# Patient Record
Sex: MALE | Race: ASIAN
[De-identification: names, ages, dates, MRNs, and addresses within clinical notes are randomized per-mention and may not be internally consistent; named-entity substitution may affect disease eponyms.]

---

## 2019-03-31 ENCOUNTER — HOSPITAL ENCOUNTER (EMERGENCY)
Dept: HOSPITAL 25 - UCEAST | Age: 34
Discharge: HOME | End: 2019-03-31
Payer: COMMERCIAL

## 2019-03-31 VITALS — DIASTOLIC BLOOD PRESSURE: 72 MMHG | SYSTOLIC BLOOD PRESSURE: 131 MMHG

## 2019-03-31 DIAGNOSIS — R07.81: Primary | ICD-10-CM

## 2019-03-31 DIAGNOSIS — F17.210: ICD-10-CM

## 2019-03-31 PROCEDURE — 99201: CPT

## 2019-03-31 PROCEDURE — G0463 HOSPITAL OUTPT CLINIC VISIT: HCPCS

## 2019-03-31 NOTE — UC
Minor Trauma HPI





- HPI Summary


HPI Summary: 








left anterior rib pain for 1 week no specific injury, no sore, rash, no n/v/d/

fevers chills/sob. Does hurt when you push on them





- History of Current Complaint


Chief Complaint: UCGeneralIllness


Stated Complaint: RIB INJURY


Time Seen by Provider: 03/31/19 17:26


Hx Obtained From: Patient


Onset/Duration: Sudden Onset, Lasting Weeks - 1, Still Present


Severity Initially: Mild


Severity Currently: Mild


Mechanism Of Injury: Unknown


Aggravating Factor(s): Other: - sneezing


Alleviating Factor(s): Nothing





- Allergies/Home Medications


Allergies/Adverse Reactions: 


 Allergies











Allergy/AdvReac Type Severity Reaction Status Date / Time


 


No Known Allergies Allergy   Verified 03/31/19 17:42











Home Medications: 


 Home Medications





NK [No Home Medications Reported]  03/31/19 [History Confirmed 03/31/19]











PMH/Surg Hx/FS Hx/Imm Hx


Previously Healthy: Yes





- Surgical History


Surgical History: Yes





- Family History


Known Family History: Positive: None





- Social History


Occupation: Unemployed


Lives: With Family


Alcohol Use: None


Substance Use Type: None


Smoking Status (MU): Current Some Day Smoker


Type: Cigarettes


Have You Smoked in the Last Year: Yes


Cessation Counseling: Counseled 3+Min - 10 Min





Review of Systems


All Other Systems Reviewed And Are Negative: Yes


Constitutional: Positive: Negative


Skin: Positive: Negative


Eyes: Positive: Negative


ENT: Positive: Negative


Respiratory: Positive: Negative


Cardiovascular: Positive: Negative


Gastrointestinal: Positive: Negative


Genitourinary: Positive: Negative


Motor: Positive: Negative


Neurovascular: Positive: Negative


Musculoskeletal: Positive: Arthralgia - left anterior axilla line below breast


Neurological: Positive: Negative


Psychological: Positive: Negative


Is Patient Immunocompromised?: No





Physical Exam


Triage Information Reviewed: Yes


Appearance: Well-Appearing, No Pain Distress, Well-Nourished


Vital Signs Reviewed: Yes


Eye Exam: Normal


Eyes: Positive: Conjunctiva Clear


ENT Exam: Normal


ENT: Positive: Normal ENT inspection, Hearing grossly normal.  Negative: Nasal 

congestion, Trismus, Muffled voice, Hoarse voice


Dental Exam: Normal


Neck exam: Normal


Neck: Positive: Supple, Nontender


Respiratory Exam: Normal


Respiratory: Positive: Chest non-tender, Lungs clear, Normal breath sounds, No 

respiratory distress, No accessory muscle use


Cardiovascular Exam: Normal


Cardiovascular: Positive: RRR, No Murmur, Pulses Normal, Brisk Capillary Refill


Abdominal Exam: Normal


Abdomen Description: Positive: Nontender, No Organomegaly, Soft.  Negative: CVA 

Tenderness (R), CVA Tenderness (L)


Bowel Sounds: Positive: Present


Musculoskeletal Exam: Normal


Musculoskeletal: Positive: Strength Intact, ROM Intact, No Edema


Neurological Exam: Normal


Neurological: Positive: Alert, Muscle Tone Normal


Psychological Exam: Normal


Skin Exam: Normal





Diagnostics





- Radiology


  ** No standard instances


Radiology Interpretation Completed By: Radiologist - no fx ,lung injury noted





Minor Trauma Course/Dx





- Course


Course Of Treatment: 





tylenol/ibuprofen/ splinting during cough and sneezing, smoking cessation 

information, [facility] referral made to follow prn





- Differential Dx/Diagnosis


Provider Diagnosis: 


 Rib pain on left side, Nicotine dependence








Discharge





- Sign-Out/Discharge


Documenting (check all that apply): Patient Departure


All imaging exams completed and their final reports reviewed: Yes





- Discharge Plan


Condition: Stable


Disposition: HOME


Patient Education Materials:  Ibuprofen (By mouth), How to Stop Smoking (ED), 

Rib Contusion (ED)


Referrals: 


[facility] [place] [Outside] - If Needed





- Billing Disposition and Condition


Condition: STABLE


Disposition: Home

## 2019-07-03 ENCOUNTER — HOSPITAL ENCOUNTER (EMERGENCY)
Dept: HOSPITAL 25 - UCEAST | Age: 34
Discharge: HOME | End: 2019-07-03
Payer: COMMERCIAL

## 2019-07-03 VITALS — SYSTOLIC BLOOD PRESSURE: 131 MMHG | DIASTOLIC BLOOD PRESSURE: 72 MMHG

## 2019-07-03 DIAGNOSIS — F17.210: ICD-10-CM

## 2019-07-03 DIAGNOSIS — R53.83: ICD-10-CM

## 2019-07-03 DIAGNOSIS — R50.9: ICD-10-CM

## 2019-07-03 DIAGNOSIS — L02.31: Primary | ICD-10-CM

## 2019-07-03 PROCEDURE — 10060 I&D ABSCESS SIMPLE/SINGLE: CPT

## 2019-07-03 PROCEDURE — 87070 CULTURE OTHR SPECIMN AEROBIC: CPT

## 2019-07-03 PROCEDURE — 99212 OFFICE O/P EST SF 10 MIN: CPT

## 2019-07-03 PROCEDURE — 87205 SMEAR GRAM STAIN: CPT

## 2019-07-03 PROCEDURE — G0463 HOSPITAL OUTPT CLINIC VISIT: HCPCS

## 2019-07-03 NOTE — UC
Skin Complaint HPI





- HPI Summary


HPI Summary: 


34-year-old male presents with 2 day history of fever, chills, fatigue, body 

aches, and a lesion to his right buttocks that he believes is a bug bite. Max 

temperature 103 F at onset of symptoms.  States the lesion to his buttocks is 

very tender to touch.  Denies nasal congestion, runny nose, ear pain, sore 

throat, cough, chest pain, shortness of breath, dizziness, or weakness.








- History of Current Complaint


Chief Complaint: UCGeneralIllness


Time Seen by Provider: 07/03/19 12:14


Stated Complaint: FEVER SKIN ISSUE


Hx Obtained From: Patient


Pain Intensity: 8





- Allergy/Home Medications


Allergies/Adverse Reactions: 


 Allergies











Allergy/AdvReac Type Severity Reaction Status Date / Time


 


No Known Allergies Allergy   Verified 03/31/19 17:42











Home Medications: 


 Home Medications





Ibuprofen 400 mg PO 07/03/19 [History]


diphenhydrAMINE HCl [Benadryl Allergy] 50 mg PO 07/03/19 [History]











PMH/Surg Hx/FS Hx/Imm Hx


Previously Healthy: Yes - Denies significant PMH





- Surgical History


Surgical History: Yes





- Family History


Known Family History: Positive: None





- Social History


Occupation: Unemployed


Lives: With Family


Alcohol Use: Weekly


Substance Use Type: None


Smoking Status (MU): Light Every Day Tobacco Smoker


Type: Cigarettes


Have You Smoked in the Last Year: Yes





Review of Systems


All Other Systems Reviewed And Are Negative: Yes


Constitutional: Positive: Fever, Chills, Fatigue


Skin: Positive: Other - See HPI


ENT: Negative: Sore Throat, Ear Ache, Nasal Discharge, Sinus Congestion, Sinus 

Pain/Tenderness


Respiratory: Negative: Shortness Of Breath, Cough


Cardiovascular: Negative: Palpitations, Chest Pain


Gastrointestinal: Negative: Abdominal Pain, Vomiting, Diarrhea, Nausea


Genitourinary: Negative: Dysuria, Hematuria, Frequency, Urgency


Musculoskeletal: Positive: Myalgia


Neurological: Negative: Headache


Is Patient Immunocompromised?: No





Physical Exam





- Summary


Physical Exam Summary: 


GENERAL APPEARANCE: Well developed, well nourished, alert and cooperative, and 

appears to be in no acute distress.





CARDIAC: Normal S1 and S2. No S3, S4 or murmurs. Rhythm is regular. There is no 

peripheral edema, cyanosis or pallor. Extremities are warm and well perfused. 

Capillary refill is less than 2 seconds. Peripheral pulses intact.





LUNGS: Clear to auscultation without rales, rhonchi, wheezing or diminished 

breath sounds.





ABDOMEN: Positive bowel sounds. Soft, nondistended, nontender. No guarding or 

rebound. No masses or hepatosplenomegally.





MUSKULOSKELETAL: ROM intact to all extremities. No joint erythema or 

tenderness. Normal muscular development. Normal gait.





SKIN: 8 cm circular area of erythema with a central area of induration 

measuring 4 cm in diameter and small amount of fluctuance to the right buttocks.





Triage Information Reviewed: Yes


Vital Signs: 


 Initial Vital Signs











Temp  100.9 F   07/03/19 12:16


 


Pulse  108   07/03/19 12:16


 


Resp  18   07/03/19 12:16


 


BP  131/72   07/03/19 12:16


 


Pulse Ox  99   07/03/19 12:16











Vital Signs Reviewed: Yes





Procedures





- Procedure Summary


Procedure Summary: 


PROCEDURE NOTE: Incision and drainage abscess right buttocks


 


PROCEDURE: Informed consent was obtained and timeout protocol was performed 

prior to initiating the procedure. The skin was prepped with Betadine and the 

area draped in the usual manner. A field block using 12 ml 2% lidocaine with 

epinephrine was performed and good anesthesia achieved. A 2 cm linear incision 

was made and scant amount of purulent material expressed. The abscess was 

explored thoroughly and sequestered pockets were opened. The wound was packed 

with iodoform gauze. A bulky gauze dressing was then placed. Bleeding was 

minimal. 


 


The patient tolerated the procedure well without complications.  Standard post-

procedure care is explained and return precautions were given.





Course/Dx





- Course


Course Of Treatment: 


34-year-old male presents with 2 day history of fever, chills, fatigue, body 

aches, and a lesion to his right buttocks that he believes is a bug bite. Max 

temperature 103 F at onset of symptoms.  States the lesion to his buttocks is 

very tender to touch.  Denies nasal congestion, runny nose, ear pain, sore 

throat, cough, chest pain, shortness of breath, dizziness, or weakness.  Mildly 

elevated temperature 100.9 F with mild tachycardia and otherwise stable vitals 

signs.  Patient had 8 cm circular area of erythema with a central area of 

induration measuring 4 cm in diameter and small amount of fluctuance to the 

right buttocks.  An incision and drainage was performed.  Wound culture was 

obtained and pending.  Patient was given Bactrim DS 1 tablet in the clinic and 

is to continue 1 tab twice daily for a total of 7 days.  He is to return in 2 

days for a wound check.  Anticipatory guidance and warning symptoms requiring 

immediate evaluation in the emergency room were provided.  Patient verbalizes 

understanding and agrees with plan of care.








- Differential Diagnoses - Skin Complaint


Differential Diagnoses: Abscess, Cellulitis, MRSA, Systemic Illness





- Diagnoses


Provider Diagnosis: 


 Abscess of right buttock








Discharge





- Sign-Out/Discharge


Documenting (check all that apply): Patient Departure


All imaging exams completed and their final reports reviewed: No Studies





- Discharge Plan


Condition: Stable


Disposition: HOME


Prescriptions: 


Sulfamethox/Trimethoprim DS* [Bactrim /160 TAB*] 1 tab PO BID #14 tab


Patient Education Materials:  Abscess (ED)


Referrals: 


No Primary Care Phys,NOPCP [Primary Care Provider] - 


Additional Instructions: 


You have an abscess (collection of pus) and infection of the skin to the right 

buttocks. We performed a procedure called an incision and drainage to try to 

drain out the infection. A wound culture was obtained and sent to the lab to 

see what bacteria grows out and to ensure that the antibiotic you were given is 

appropriate to treat the infection.





Leave the dressing that was applied in the clinic in place for the next 24 

hours. If the is drainage through the dressing just add more gauze.





There was a small piece of gauze inserted into the incision to keep the wound 

open and allow for draining to occur. Leave this in place until you return for 

wound check. If the packing falls out do not try to reinsert, simply leave to 

wound open.





After 24 hours, you may remove the dressing and shower as normal. Put a new 

gauze dressing over the wound to collect any drainage.





Use acetaminophen (Tylenol) or ibuprofen (Advil, Motrin) according to 

directions as needed for fever or pain.





Return here in 2 days for a recheck of the wound.





Seek immediate medical attention in the emergency room if you have a persistent 

fever greater than 100.5 F despite taking acetaminophen or ibuprofen, have 

severe pain not managed with pain medication, redness that continues to spread, 

you become weak or dizzy, or have any worsening of symptoms.





- Billing Disposition and Condition


Condition: STABLE


Disposition: Home

## 2019-07-05 ENCOUNTER — HOSPITAL ENCOUNTER (EMERGENCY)
Dept: HOSPITAL 25 - UCEAST | Age: 34
Discharge: HOME | End: 2019-07-05
Payer: COMMERCIAL

## 2019-07-05 VITALS — SYSTOLIC BLOOD PRESSURE: 112 MMHG | DIASTOLIC BLOOD PRESSURE: 68 MMHG

## 2019-07-05 DIAGNOSIS — L02.31: ICD-10-CM

## 2019-07-05 DIAGNOSIS — F17.210: ICD-10-CM

## 2019-07-05 DIAGNOSIS — Z51.89: Primary | ICD-10-CM

## 2019-07-05 PROCEDURE — G0463 HOSPITAL OUTPT CLINIC VISIT: HCPCS

## 2019-07-05 PROCEDURE — 99212 OFFICE O/P EST SF 10 MIN: CPT

## 2019-07-05 NOTE — UC
Skin Complaint HPI





- HPI Summary


HPI Summary: 





33 y/o male presents to the urgent care c/o


Pt needs a wound recheck from I&D in Wednesday.





- History of Current Complaint


Chief Complaint: UCWounds


Time Seen by Provider: 07/05/19 16:47


Stated Complaint: RECHECK SKIN COMPLAINT


Hx Obtained From: Patient


Onset/Duration: Gradual Onset, Lasting Weeks - 1 week, Other - getting better 

after I&D 2 days ago


Skin Exposure Onset/Duration: Weeks Ago - 1 week


Timing: Constant


Onset Severity: Severe


Current Severity: Mild


Pain Intensity: 1


Pain Scale Used: 0-10 Numeric


Location: Discrete - RT gluteus


Character: Swelling, Pain, Redness


Aggravating Factor(s): Touch


Alleviating Factor(s): OTC Meds, Other - Bactrim PO


Associated Signs & Symptoms: Positive: Rash - abscess in the Rt gluteus, 

Tenderness.  Negative: Fever, Chills, Drainage


Related History: Possible Reaction to: Environmental Exposure





- Allergy/Home Medications


Allergies/Adverse Reactions: 


 Allergies











Allergy/AdvReac Type Severity Reaction Status Date / Time


 


No Known Allergies Allergy   Verified 07/05/19 15:49














PMH/Surg Hx/FS Hx/Imm Hx


Previously Healthy: Yes - Pt denies PMHX





- Surgical History


Surgical History: Yes





- Family History


Known Family History: Positive: Hypertension, Diabetes





- Social History


Occupation: Employed Full-time


Lives: With Family


Alcohol Use: Weekly


Substance Use Type: None


Smoking Status (MU): Light Every Day Tobacco Smoker


Type: Cigarettes


Have You Smoked in the Last Year: Yes





Review of Systems


All Other Systems Reviewed And Are Negative: Yes


Constitutional: Positive: Negative


Skin: Positive: Other - abscess in the RT gluteus s/p I&D getting better


Eyes: Positive: Negative


ENT: Positive: Negative


Respiratory: Positive: Negative


Cardiovascular: Positive: Negative


Gastrointestinal: Positive: Negative


Genitourinary: Positive: Negative


Motor: Positive: Negative


Neurovascular: Positive: Negative


Musculoskeletal: Positive: Negative


Neurological: Positive: Negative


Psychological: Positive: Negative


Is Patient Immunocompromised?: No





Physical Exam





- Summary


Physical Exam Summary: 





Vital Signs Reviewed: Yes


General: well developed, well nourished male sitting in the examining table w/o 

any apparent distress


Eye Exam: Normal


Eyes: Positive: Conjunctiva Clear - PERRLA, EOMI, fundi grossly normal


ENT: Positive: Normal ENT inspection, Hearing grossly normal, Pharynx normal, 

TMs normal


Neck: Positive: Supple, Nontender, No Lymphadenopathy


Respiratory: Positive: Chest non-tender, Lungs clear, Normal breath sounds, No 

respiratory distress


Cardiovascular: Positive: RRR, No Murmur, Pulses Normal, Brisk Capillary Refill


Abdomen Description: Positive: Nontender, No Organomegaly, Soft.  Negative: CVA 

Tenderness (R), CVA Tenderness (L)


Bowel Sounds: Positive: Present


Musculoskeletal: Positive: Strength Intact, ROM Intact, No Edema


Neurological: Positive: Alert, Muscle Tone Normal


Psychological Exam: Normal


Skin: Positive: RT gluteus w/ erythematous eruption  w/ indistinct dorederd 

around incision s/p I&D of abscess mild tender to palpation, no swollen, and 

warm to touch about .5cm in size.  sensation is intact, capillary refill WNL, 

reflexes WNL








Triage Information Reviewed: Yes


Vital Signs: 


 Initial Vital Signs











Temp  98.1 F   07/05/19 15:44


 


Pulse  77   07/05/19 15:44


 


Resp  16   07/05/19 15:44


 


BP  112/68   07/05/19 15:44


 


Pulse Ox  99   07/05/19 15:44














Course/Dx





- Course


Course Of Treatment: 





PT w/ RT gluteus w/ erythematous eruption  w/ indistinct dorederd around 

incision s/p I&D of abscess mild tender to palpation, no swollen, and warm to 

touch about .5cm in size.





- Differential Diagnoses - Skin Complaint


Differential Diagnoses: Abscess, Cellulitis, Contact Dermatitis, MRSA





- Diagnoses


Provider Diagnosis: 


 Abscess, gluteal, right, Abscess re-check








Discharge





- Sign-Out/Discharge


Documenting (check all that apply): Patient Departure - d/c home


All imaging exams completed and their final reports reviewed: No Studies





- Discharge Plan


Condition: Stable


Disposition: HOME


Prescriptions: 


Bacitracin OINTMENT* 1 applic TOPICAL BID #1 tube


Patient Education Materials:  Abscess (ED)


Referrals: 


No Primary Care Phys,NOPCP [Primary Care Provider] - 


Additional Instructions: 


1-Please continue taken Bactrim PO as directed. Keep wound clean and dry with a 

sterile dressing. Apply bacitracin topical as directed


2- Wound culture came positive for normal salud.  


3- continue taking Ibuprofen PO q6-8hrs prn for pain or swelling. 


4-If you develop fever or redness despite antibiotic  please go to the ER 

immediately or return to the Urgent care.








- Billing Disposition and Condition


Condition: STABLE


Disposition: Home

## 2019-07-20 ENCOUNTER — HOSPITAL ENCOUNTER (EMERGENCY)
Dept: HOSPITAL 25 - UCEAST | Age: 34
Discharge: HOME | End: 2019-07-20
Payer: COMMERCIAL

## 2019-07-20 VITALS — DIASTOLIC BLOOD PRESSURE: 81 MMHG | SYSTOLIC BLOOD PRESSURE: 119 MMHG

## 2019-07-20 DIAGNOSIS — F17.210: ICD-10-CM

## 2019-07-20 DIAGNOSIS — M54.5: Primary | ICD-10-CM

## 2019-07-20 PROCEDURE — G0463 HOSPITAL OUTPT CLINIC VISIT: HCPCS

## 2019-07-20 PROCEDURE — 99202 OFFICE O/P NEW SF 15 MIN: CPT

## 2019-07-20 NOTE — UC
Back Pain HPI





- HPI Summary


HPI Summary: 


this patient is a 34-year-old male who presents to the urgent care with chief 

complaint of right-sided back pain.  Patient to the right leg.  The patient 

reports that 2 weeks ago he had an abscess and agreed and he has been very 

painful however the symptoms of the abscesses have resolved.  But because of 

the pain he was sitting in his left side.  In the last couple days he has 

developed right-sided low back pain with radiation to the right upper 

extremity.  Patient denies any urinary bowel dysfunction.  The patient is able 

to ambulate which he decreased the pain.  However when he sits up the pain 

increases.  He has no other complaints.








- History of Current Complaint


Chief Complaint: UCBackPain


Stated Complaint: BACK PAIN


Time Seen by Provider: 07/20/19 14:39


Hx Obtained From: Patient


Onset/Duration: Gradual Onset


Timing: Constant


Severity Initially: Mild


Pain Intensity: 9





- Allergies/Home Medications


Allergies/Adverse Reactions: 


 Allergies











Allergy/AdvReac Type Severity Reaction Status Date / Time


 


No Known Allergies Allergy   Verified 07/20/19 14:46














PMH/Surg Hx/FS Hx/Imm Hx


Previously Healthy: Yes





- Surgical History


Surgical History: Yes





- Family History


Known Family History: Positive: None, Hypertension, Diabetes





- Social History


Alcohol Use: Weekly


Substance Use Type: None


Smoking Status (MU): Light Every Day Tobacco Smoker


Type: Cigarettes


Have You Smoked in the Last Year: Yes





Review of Systems


All Other Systems Reviewed And Are Negative: Yes


Constitutional: Positive: Negative


Skin: Positive: Negative


Eyes: Positive: Negative


ENT: Positive: Negative


Respiratory: Positive: Negative


Cardiovascular: Positive: Negative


Gastrointestinal: Positive: Negative


Genitourinary: Positive: Negative


Motor: Positive: Negative


Neurovascular: Positive: Negative


Musculoskeletal: Positive: Other: - back pain


Neurological: Positive: Negative


Psychological: Positive: Negative


Is Patient Immunocompromised?: No





Physical Exam





- Summary


Physical Exam Summary: 





General: Patient is a well developed male without any distress that is lying 

comfortably in the stretcher.  


Skin: Pink, warm, dry


HEAD AND FACE: No signs of trauma.  


EYES: PERRLA, EOMI x 2.


EARS: Hearing grossly intact.


MOUTH: Oropharynx within normal limits.


NECK: Supple, trachea is midline, no adenopathy, no JVD.


CHEST: Symmetric, no tenderness at palpation


LUNGS: CTA bilaterally, no rales, rhonchi or wheezing


CVS: RRR, no murmurs, rubs, or gallops


ABDOMEN: soft and Nontender without masses, no guarding or rebound. Bowel 

sounds are active. No Hepatosplenomegaly. No signs of inguinal hernias.


BACK: Patient walked in to the  room with symmetric ambulation, No signs of 

limping, antalgic, able to bear weight. No signs of trauma, no soft tissue 

ecchymosis. Positive paraspinal muscle tenderness in the lumbar spine. No 

masses palpated. No point tenderness.  No CVAT, no flank ecchymosis . No 

sacroiliac notch tenderness, No saddle anesthesia.


ROM: flexion, extension, lateral bending and rotation limited secondary to pain.


Straight Leg Raise: Negative. Positive at 45 degrees. 


Patellar reflexes: brisk, symmetric 


Muscle strength lower extremities: Dorsiflexion, plantar flexion of ankles 

normal. Heel, toe walk normal. 


Pulses: Femoral, popliteal, posterior tibial, and pedal pulses strong and 

palpable.


Rectal: Patient refused the exam. 


Triage Information Reviewed: Yes


Appearance: Well-Appearing


Vital Signs: 


 Initial Vital Signs











Temp  98.1 F   07/20/19 14:40


 


Pulse  116   07/20/19 14:40


 


Resp  16   07/20/19 14:40


 


BP  119/81   07/20/19 14:40


 


Pulse Ox  100   07/20/19 14:40











Vital Signs Reviewed: Yes





Back Pain Course/Dx





- Course


Course Of Treatment: 





in the ED course the patient declined any x-rays since the patient doesn't have 

any history of trauma or heavy lifting.  He just thinks that he pulled a 

muscle.  I agree that the patient has a muscular spasm.  Therefore the patient 

was given ibuprofen, Flexeril, Decadron.  I will send a prescription for 

ibuprofen, Robaxin, Medrol Dosepak, and Norco.


The patient is discharged to go home and follow up with the primary care 

physician.  The patient is ambulating out of the ER.


Patient was given instructions to return to the urgent care or go to the 

emergency department if he develops any type of numbness, weakness, bowel or 

urinary dysfunction, increasing back pain, or any other symptom.  The patient 

understands and agrees





- Differential Dx/Diagnosis


Provider Diagnosis: 


 Acute low back pain








Discharge





- Sign-Out/Discharge


Documenting (check all that apply): Patient Departure


All imaging exams completed and their final reports reviewed: No Studies





- Discharge Plan


Condition: Stable


Disposition: HOME


Prescriptions: 


HYDROcodone/ACETAMIN 5-325 MG* [Norco 5-325 TAB*] 1 tab PO Q6H PRN #12 tab MDD 4


 PRN Reason: Pain


Ibuprofen TAB* [Motrin TAB* 600 MG] 600 mg PO Q8H PRN #30 tab


 PRN Reason: Pain


Methocarbamol TAB* [Robaxin 500 MG TAB*] 500 mg PO TID PRN #12 tab


 PRN Reason: Pain


methylPREDNISolone [Medrol Dosepak 4 MG*] 0 mg PO .SEE SANCHEZ INSTRUCTION #1 sanchez


Patient Education Materials:  Back Pain (ED)


Referrals: 


No Primary Care Phys,NOPCP [Primary Care Provider] - 


[facility] [facility] [Outside]


Additional Instructions: 


Take medications as instructed


Increase your fluid intake


F/U with PCP in the next 2-3 days


Return to the UC if symptoms worsen








- Billing Disposition and Condition


Condition: STABLE


Disposition: Home